# Patient Record
Sex: FEMALE | Race: WHITE | Employment: STUDENT | URBAN - METROPOLITAN AREA
[De-identification: names, ages, dates, MRNs, and addresses within clinical notes are randomized per-mention and may not be internally consistent; named-entity substitution may affect disease eponyms.]

---

## 2017-05-04 ENCOUNTER — APPOINTMENT (OUTPATIENT)
Dept: CT IMAGING | Age: 22
End: 2017-05-04
Payer: COMMERCIAL

## 2017-05-04 ENCOUNTER — HOSPITAL ENCOUNTER (EMERGENCY)
Age: 22
Discharge: HOME | End: 2017-05-04
Attending: EMERGENCY MEDICINE
Payer: COMMERCIAL

## 2017-05-04 VITALS
RESPIRATION RATE: 19 BRPM | TEMPERATURE: 98.1 F | HEIGHT: 68 IN | DIASTOLIC BLOOD PRESSURE: 67 MMHG | HEART RATE: 88 BPM | SYSTOLIC BLOOD PRESSURE: 111 MMHG | WEIGHT: 170 LBS | OXYGEN SATURATION: 98 % | BODY MASS INDEX: 25.76 KG/M2

## 2017-05-04 DIAGNOSIS — S09.90XA HEAD INJURY, INITIAL ENCOUNTER: ICD-10-CM

## 2017-05-04 DIAGNOSIS — S00.01XA SCALP ABRASION, INITIAL ENCOUNTER: Primary | ICD-10-CM

## 2017-05-04 PROCEDURE — 99283 EMERGENCY DEPT VISIT LOW MDM: CPT

## 2017-05-04 PROCEDURE — 6370000000 HC RX 637 (ALT 250 FOR IP): Performed by: EMERGENCY MEDICINE

## 2017-05-04 PROCEDURE — 70450 CT HEAD/BRAIN W/O DYE: CPT

## 2017-05-04 RX ORDER — GINSENG 100 MG
CAPSULE ORAL ONCE
Status: COMPLETED | OUTPATIENT
Start: 2017-05-04 | End: 2017-05-04

## 2017-05-04 RX ORDER — GINSENG 100 MG
CAPSULE ORAL 3 TIMES DAILY
Status: DISCONTINUED | OUTPATIENT
Start: 2017-05-04 | End: 2017-05-04 | Stop reason: HOSPADM

## 2017-05-04 RX ADMIN — BACITRACIN ZINC 1 G: 500 OINTMENT TOPICAL at 01:42

## 2017-05-04 ASSESSMENT — ENCOUNTER SYMPTOMS
STRIDOR: 0
BACK PAIN: 0
TROUBLE SWALLOWING: 0
VOICE CHANGE: 0
WHEEZING: 0
COUGH: 0
BLOOD IN STOOL: 0
CHEST TIGHTNESS: 0
CHOKING: 0
CONSTIPATION: 0
EYE PAIN: 0
SORE THROAT: 0
VOMITING: 0
SINUS PRESSURE: 0
SHORTNESS OF BREATH: 0
EYE REDNESS: 0
ABDOMINAL PAIN: 0
DIARRHEA: 0
FACIAL SWELLING: 0
EYE DISCHARGE: 0

## 2017-05-04 ASSESSMENT — PAIN DESCRIPTION - LOCATION: LOCATION: HEAD;NECK

## 2017-05-04 ASSESSMENT — PAIN DESCRIPTION - PAIN TYPE: TYPE: ACUTE PAIN

## 2017-05-04 ASSESSMENT — PAIN SCALES - GENERAL: PAINLEVEL_OUTOF10: 3

## 2017-05-04 ASSESSMENT — PAIN DESCRIPTION - DESCRIPTORS: DESCRIPTORS: PRESSURE;CONSTANT

## 2017-05-04 ASSESSMENT — PAIN DESCRIPTION - FREQUENCY: FREQUENCY: CONTINUOUS

## 2020-02-21 ENCOUNTER — HOSPITAL ENCOUNTER (EMERGENCY)
Age: 25
Discharge: HOME | End: 2020-02-21
Attending: INTERNAL MEDICINE

## 2020-02-21 VITALS
SYSTOLIC BLOOD PRESSURE: 114 MMHG | OXYGEN SATURATION: 98 % | BODY MASS INDEX: 27.28 KG/M2 | DIASTOLIC BLOOD PRESSURE: 69 MMHG | RESPIRATION RATE: 16 BRPM | TEMPERATURE: 98.1 F | HEIGHT: 68 IN | HEART RATE: 95 BPM | WEIGHT: 180 LBS

## 2020-02-21 PROCEDURE — 6370000000 HC RX 637 (ALT 250 FOR IP): Performed by: INTERNAL MEDICINE

## 2020-02-21 PROCEDURE — 99283 EMERGENCY DEPT VISIT LOW MDM: CPT

## 2020-02-21 RX ORDER — DIAPER,BRIEF,INFANT-TODD,DISP
EACH MISCELLANEOUS 2 TIMES DAILY
Status: DISCONTINUED | OUTPATIENT
Start: 2020-02-21 | End: 2020-02-21

## 2020-02-21 RX ORDER — DIAPER,BRIEF,INFANT-TODD,DISP
EACH MISCELLANEOUS ONCE
Status: COMPLETED | OUTPATIENT
Start: 2020-02-21 | End: 2020-02-21

## 2020-02-21 RX ADMIN — BACITRACIN: 500 OINTMENT TOPICAL at 19:45

## 2020-02-21 NOTE — ED PROVIDER NOTES
[facility] ED  EMERGENCY DEPARTMENT ENCOUNTER      Pt Name: Arabella Hu  MRN: [id number]  [place] 1995  Date of evaluation: 2/21/2020  Provider: Miguel Buchanan MD    CHIEF COMPLAINT       Chief Complaint   Patient presents with    Head Injury     pt states she hit her head last night while intoxicated and states she hit her head on a wall intentionally. pt hit the back of her head, pt denies having neck pain or back pain at this time         HISTORY OF PRESENT ILLNESS   (Location/Symptom, Timing/Onset, Context/Setting, Quality, Duration, Modifying Factors, Severity)  Note limiting factors. Arabella Hu is a 27 y.o. female whose gender pronoun is \"they\" presents to the emergency department accompanied by their father for evaluation and management of head injury. Alma Lozano is a student at [employer] and is finishing there undergraduate degree this spring. They is from [place]. They stated that last night they  hit their head on a wall multiple times while they were intoxicated is an active response in protest.  They later reported that their injury was intentional but they no longer have the intent. They also sustained multiple facial right forearm lacerations due to cutting/self harm. They reports that they is in weekly therapy sessions and the next one is scheduled for next week. They is on no medical treatment of their depression. They adamantly denies suicidal ideation, homicidall ideation, access to weapons. They denies threat to themselves, bullying. They is concerned about their father and his lack of ability to take care of himself. When asked Alma Lozano who brought them here, they reported that their father brought her to the ER. He drove from [place] and surprised her. HPI    Nursing Notes were reviewed. REVIEW OF SYSTEMS    (2-9 systems for level 4, 10 or more for level 5)       REVIEW OF SYSTEMS    Constitutional: Negative for fatigue and fever. session: None    Stress: None   Relationships    Social connections:     Talks on phone: None     Gets together: None     Attends Orthodoxy service: None     Active member of club or organization: None     Attends meetings of clubs or organizations: None     Relationship status: None    Intimate partner violence:     Fear of current or ex partner: None     Emotionally abused: None     Physically abused: None     Forced sexual activity: None   Other Topics Concern    None   Social History Narrative    None       SCREENINGS             PHYSICAL EXAM    (up to 7 for level 4, 8 or more for level 5)     ED Triage Vitals [02/21/20 1837]   BP Temp Temp src Pulse Resp SpO2 Height Weight   114/69 98.1 °F (36.7 °C) -- 95 16 98 % 5' 8\" (1.727 m) 180 lb (81.6 kg)       Physical Exam  Physical Exam   Constitutional:  Appears well, well-developed and well-nourished. No distress noted. Non toxic in appearance. HENT:     Head: Normocephalic and atraumatic. Scalp palpated and no identifiable focus of tenderness is noted    Right Ear: External ear normal. TM normal pearly light reflex. No hemotympanum, mastoid tenderness or wooten sign. LeftEar: External ear normal. TM normal pearly light reflex. No hemotympanum, mastoid tenderness or wooten sign. Nose: Nose normal.     Mouth/Throat: Oropharynx is clear and mucosa moist. No oropharyngeal exudate noted. Posterior pharynx is pink and noninjected. Eyes: Conjunctivae and EOM are normal. Pupils are equal,round, and reactive to light. No scleral icterus. Neck: Normal range of motion. Neck supple. No tracheal deviation present. Cardiovascular: Normal rate, regular rhythm, normal heartsounds and intact distal pulses. Exam reveals no gallop or friction rub. No murmur heard. Pulmonary/Chest: Effort normal and breath sounds are symmetric and normal. No respiratory distress. There are no wheezes, rales or rhonchi.  No tenderness is exhibited upon palpation of the chest

## 2020-03-13 ENCOUNTER — HOSPITAL ENCOUNTER (EMERGENCY)
Age: 25
Discharge: HOME | End: 2020-03-13
Attending: EMERGENCY MEDICINE
Payer: COMMERCIAL

## 2020-03-13 VITALS
SYSTOLIC BLOOD PRESSURE: 133 MMHG | HEART RATE: 131 BPM | TEMPERATURE: 98.7 F | WEIGHT: 185 LBS | HEIGHT: 67 IN | DIASTOLIC BLOOD PRESSURE: 72 MMHG | RESPIRATION RATE: 16 BRPM | BODY MASS INDEX: 29.03 KG/M2

## 2020-03-13 PROCEDURE — 99283 EMERGENCY DEPT VISIT LOW MDM: CPT

## 2020-03-13 NOTE — ED PROVIDER NOTES
declined further psychiatric and medical treatment. She has scars on her own arms from cutting but she was not suicidal at that time either. She does have a psychiatric history, no severe problems since she was 14. Alert and oriented she declined further psychiatric or medical testing. She states she will return for any type of suicidal ideation or mental problems. There are no hallucinations. She is very articulate, makes perfect sense, good diction, high intelligence. I do not feel that I have a legal right to detain her at this juncture. RADIOLOGY:   Non-plain filmimages such as CT, Ultrasound and MRI are read by the radiologist. Plain radiographic images are visualized and preliminarily interpreted by the emergency physician with the below findings:        Interpretation per the Radiologist below, if available at the time ofthis note:    No orders to display     I took her pulse while I was examining her and pulse is 96. ED BEDSIDE ULTRASOUND:   Performed by ED Physician - none    LABS:  Labs Reviewed - No data to display    All other labs were within normal range or not returned as of this dictation. EMERGENCY DEPARTMENT COURSE and DIFFERENTIAL DIAGNOSIS/MDM:   Vitals:    Vitals:    03/13/20 0154   BP: 133/72   Pulse: 131   Resp: 16   Temp: 98.7 °F (37.1 °C)   TempSrc: Oral   Weight: 185 lb (83.9 kg)   Height: 5' 7\" (1.702 m)           MDM    CRITICAL CARE TIME   Total Critical Care time was  minutes, excluding separately reportableprocedures. There was a high probability of clinicallysignificant/life threatening deterioration in the patient's condition which required my urgent intervention. CONSULTS:  None    PROCEDURES:  Unless otherwise noted below, none     Procedures    FINAL IMPRESSION      1.  Adjustment disorder with anxiety          DISPOSITION/PLAN   DISPOSITION Decision To Discharge 03/13/2020 02:04:09 AM      PATIENT REFERRED TO:  Jayla Yan MD  [address] [place] [address] [address]  [facility] [address]  [phone]            DISCHARGE MEDICATIONS:  New Prescriptions    No medications on file          (Please note that portions of this note were completed with a voice recognition program.  Efforts were made to edit the dictations but occasionally words are mis-transcribed.)    Tom Liz MD (electronically signed)  Attending Emergency Physician          Tom Liz MD  03/13/20 8741

## 2020-03-14 ENCOUNTER — HOSPITAL ENCOUNTER (EMERGENCY)
Age: 25
Discharge: ANOTHER HOSPITAL | End: 2020-03-14
Attending: EMERGENCY MEDICINE
Payer: COMMERCIAL

## 2020-03-14 ENCOUNTER — HOSPITAL ENCOUNTER (EMERGENCY)
Age: 25
Discharge: HOME | End: 2020-03-14
Attending: EMERGENCY MEDICINE
Payer: COMMERCIAL

## 2020-03-14 VITALS
TEMPERATURE: 97.9 F | DIASTOLIC BLOOD PRESSURE: 71 MMHG | BODY MASS INDEX: 29.03 KG/M2 | HEART RATE: 79 BPM | OXYGEN SATURATION: 98 % | RESPIRATION RATE: 18 BRPM | WEIGHT: 185 LBS | HEIGHT: 67 IN | SYSTOLIC BLOOD PRESSURE: 110 MMHG

## 2020-03-14 VITALS
OXYGEN SATURATION: 99 % | RESPIRATION RATE: 18 BRPM | DIASTOLIC BLOOD PRESSURE: 79 MMHG | HEART RATE: 78 BPM | BODY MASS INDEX: 28.98 KG/M2 | SYSTOLIC BLOOD PRESSURE: 124 MMHG | WEIGHT: 185 LBS | TEMPERATURE: 98.6 F

## 2020-03-14 LAB
ACETAMINOPHEN LEVEL: <15 UG/ML (ref 10–30)
ALBUMIN SERPL-MCNC: 4.8 G/DL (ref 3.5–4.6)
ALP BLD-CCNC: 46 U/L (ref 40–130)
ALT SERPL-CCNC: 11 U/L (ref 0–33)
AMPHETAMINE SCREEN, URINE: NORMAL
ANION GAP SERPL CALCULATED.3IONS-SCNC: 18 MEQ/L (ref 9–15)
AST SERPL-CCNC: 16 U/L (ref 0–35)
BACTERIA: ABNORMAL /HPF
BARBITURATE SCREEN URINE: NORMAL
BASOPHILS ABSOLUTE: 0 K/UL (ref 0–0.2)
BASOPHILS RELATIVE PERCENT: 0.4 %
BENZODIAZEPINE SCREEN, URINE: NORMAL
BILIRUB SERPL-MCNC: <0.2 MG/DL (ref 0.2–0.7)
BILIRUBIN URINE: NEGATIVE
BLOOD, URINE: ABNORMAL
BUN BLDV-MCNC: 8 MG/DL (ref 6–20)
CALCIUM SERPL-MCNC: 9.6 MG/DL (ref 8.5–9.9)
CANNABINOID SCREEN URINE: NORMAL
CHLORIDE BLD-SCNC: 104 MEQ/L (ref 95–107)
CLARITY: CLEAR
CO2: 21 MEQ/L (ref 20–31)
COCAINE METABOLITE SCREEN URINE: NORMAL
COLOR: YELLOW
CREAT SERPL-MCNC: 0.59 MG/DL (ref 0.5–0.9)
EKG ATRIAL RATE: 116 BPM
EKG ATRIAL RATE: 92 BPM
EKG P AXIS: 40 DEGREES
EKG P AXIS: 44 DEGREES
EKG P-R INTERVAL: 130 MS
EKG P-R INTERVAL: 134 MS
EKG Q-T INTERVAL: 366 MS
EKG Q-T INTERVAL: 376 MS
EKG QRS DURATION: 86 MS
EKG QRS DURATION: 90 MS
EKG QTC CALCULATION (BAZETT): 464 MS
EKG QTC CALCULATION (BAZETT): 508 MS
EKG R AXIS: 34 DEGREES
EKG R AXIS: 50 DEGREES
EKG T AXIS: 22 DEGREES
EKG T AXIS: 32 DEGREES
EKG VENTRICULAR RATE: 116 BPM
EKG VENTRICULAR RATE: 92 BPM
EOSINOPHILS ABSOLUTE: 0 K/UL (ref 0–0.7)
EOSINOPHILS RELATIVE PERCENT: 0.2 %
EPITHELIAL CELLS, UA: ABNORMAL /HPF
ETHANOL PERCENT: 0.07 G/DL
ETHANOL PERCENT: 0.15 G/DL
ETHANOL: 177 MG/DL (ref 0–0.08)
ETHANOL: 74 MG/DL (ref 0–0.08)
GFR AFRICAN AMERICAN: >60
GFR NON-AFRICAN AMERICAN: >60
GLOBULIN: 3.2 G/DL (ref 2.3–3.5)
GLUCOSE BLD-MCNC: 110 MG/DL (ref 70–99)
GLUCOSE URINE: NEGATIVE MG/DL
HCG(URINE) PREGNANCY TEST: NEGATIVE
HCT VFR BLD CALC: 36.1 % (ref 37–47)
HEMOGLOBIN: 11.9 G/DL (ref 12–16)
KETONES, URINE: ABNORMAL MG/DL
LEUKOCYTE ESTERASE, URINE: NEGATIVE
LYMPHOCYTES ABSOLUTE: 2.1 K/UL (ref 1–4.8)
LYMPHOCYTES RELATIVE PERCENT: 30.2 %
Lab: NORMAL
MCH RBC QN AUTO: 28.1 PG (ref 27–31.3)
MCHC RBC AUTO-ENTMCNC: 32.8 % (ref 33–37)
MCV RBC AUTO: 85.6 FL (ref 82–100)
METHADONE SCREEN, URINE: NORMAL
MONOCYTES ABSOLUTE: 0.4 K/UL (ref 0.2–0.8)
MONOCYTES RELATIVE PERCENT: 5.3 %
NEUTROPHILS ABSOLUTE: 4.5 K/UL (ref 1.4–6.5)
NEUTROPHILS RELATIVE PERCENT: 63.9 %
NITRITE, URINE: NEGATIVE
OPIATE SCREEN URINE: NORMAL
OXYCODONE URINE: NORMAL
PDW BLD-RTO: 14.1 % (ref 11.5–14.5)
PH UA: 5 (ref 5–9)
PHENCYCLIDINE SCREEN URINE: NORMAL
PLATELET # BLD: 290 K/UL (ref 130–400)
POTASSIUM SERPL-SCNC: 3.6 MEQ/L (ref 3.4–4.9)
PROPOXYPHENE SCREEN: NORMAL
PROTEIN UA: NEGATIVE MG/DL
RBC # BLD: 4.22 M/UL (ref 4.2–5.4)
RBC UA: ABNORMAL /HPF (ref 0–2)
SALICYLATE, SERUM: <0.3 MG/DL (ref 15–30)
SODIUM BLD-SCNC: 143 MEQ/L (ref 135–144)
SPECIFIC GRAVITY UA: 1.02 (ref 1–1.03)
TOTAL CK: 98 U/L (ref 0–170)
TOTAL PROTEIN: 8 G/DL (ref 6.3–8)
URINE REFLEX TO CULTURE: YES
UROBILINOGEN, URINE: 0.2 E.U./DL
WBC # BLD: 7 K/UL (ref 4.8–10.8)
WBC UA: ABNORMAL /HPF (ref 0–5)

## 2020-03-14 PROCEDURE — G0480 DRUG TEST DEF 1-7 CLASSES: HCPCS

## 2020-03-14 PROCEDURE — 36415 COLL VENOUS BLD VENIPUNCTURE: CPT

## 2020-03-14 PROCEDURE — 85025 COMPLETE CBC W/AUTO DIFF WBC: CPT

## 2020-03-14 PROCEDURE — 80053 COMPREHEN METABOLIC PANEL: CPT

## 2020-03-14 PROCEDURE — 99285 EMERGENCY DEPT VISIT HI MDM: CPT

## 2020-03-14 PROCEDURE — 93005 ELECTROCARDIOGRAM TRACING: CPT

## 2020-03-14 PROCEDURE — 80307 DRUG TEST PRSMV CHEM ANLYZR: CPT

## 2020-03-14 PROCEDURE — 93005 ELECTROCARDIOGRAM TRACING: CPT | Performed by: PSYCHIATRY & NEUROLOGY

## 2020-03-14 PROCEDURE — 81001 URINALYSIS AUTO W/SCOPE: CPT

## 2020-03-14 PROCEDURE — 93005 ELECTROCARDIOGRAM TRACING: CPT | Performed by: EMERGENCY MEDICINE

## 2020-03-14 PROCEDURE — 87086 URINE CULTURE/COLONY COUNT: CPT

## 2020-03-14 PROCEDURE — 82550 ASSAY OF CK (CPK): CPT

## 2020-03-14 PROCEDURE — 6370000000 HC RX 637 (ALT 250 FOR IP): Performed by: EMERGENCY MEDICINE

## 2020-03-14 PROCEDURE — 99284 EMERGENCY DEPT VISIT MOD MDM: CPT

## 2020-03-14 PROCEDURE — 84703 CHORIONIC GONADOTROPIN ASSAY: CPT

## 2020-03-14 RX ORDER — ONDANSETRON 4 MG/1
4 TABLET, ORALLY DISINTEGRATING ORAL ONCE
Status: COMPLETED | OUTPATIENT
Start: 2020-03-14 | End: 2020-03-14

## 2020-03-14 RX ADMIN — ONDANSETRON 4 MG: 4 TABLET, ORALLY DISINTEGRATING ORAL at 13:36

## 2020-03-14 ASSESSMENT — ENCOUNTER SYMPTOMS
NAUSEA: 0
COUGH: 0
WHEEZING: 0
ABDOMINAL DISTENTION: 0
SHORTNESS OF BREATH: 0
ABDOMINAL PAIN: 0
RHINORRHEA: 0
COLOR CHANGE: 0
VOMITING: 0
SORE THROAT: 0
BACK PAIN: 0
EYE PAIN: 0
DIARRHEA: 0
APNEA: 0
PHOTOPHOBIA: 0
SINUS PRESSURE: 0
CONSTIPATION: 0

## 2020-03-14 ASSESSMENT — PAIN DESCRIPTION - ONSET: ONSET: ON-GOING

## 2020-03-14 NOTE — ED TRIAGE NOTES
PT BROUGHT INTO HOSPITAL BY FRIENDS AFTER DRINKING TO [facility] AND WANTING TO STAB SELF

## 2020-03-14 NOTE — ED PROVIDER NOTES
[address] [place] [address] ED  eMERGENCY dEPARTMENT eNCOUnter      Pt Name: Eliza Gasca  MRN: [id number]  [place] 1995  Date of evaluation: 3/14/2020  Provider: Siena Alexander MD    CHIEF COMPLAINT       Chief Complaint   Patient presents with    Alcohol Intoxication    Suicidal         HISTORY OF PRESENT ILLNESS   (Location/Symptom, Timing/Onset,Context/Setting, Quality, Duration, Modifying Factors, Severity)  Note limiting factors. Eliza Gasca is a 27 y.o. female who presents to the emergency department with complaint:  of patient brought in by her college friends alcohol intoxication and suicidal thoughts with a plan. She plans stabbing herself in the Buffy the vampire slayer style. Was seen in our ED yesterday with alcohol intoxication and released after denying suicidal intention or thoughts. She denies any pain or discomfort. HPI    Nursing Notes were reviewed. REVIEW OF SYSTEMS    (2-9 systems for level 4, 10 or more for level 5)     Review of Systems   Constitutional: Negative. Negative for activity change, appetite change, chills, fatigue and fever. HENT: Negative for congestion, ear discharge, ear pain, hearing loss, rhinorrhea, sinus pressure and sore throat. Eyes: Negative for photophobia, pain and visual disturbance. Respiratory: Negative for apnea, cough, shortness of breath and wheezing. Cardiovascular: Negative for chest pain, palpitations and leg swelling. Gastrointestinal: Negative for abdominal distention, abdominal pain, constipation, diarrhea, nausea and vomiting. Endocrine: Negative for cold intolerance, heat intolerance and polyuria. Genitourinary: Negative for dysuria, flank pain, frequency and urgency. Musculoskeletal: Negative for arthralgias, back pain, gait problem, myalgias and neck stiffness. Skin: Negative for color change, pallor and rash. Allergic/Immunologic: Negative for food allergies and immunocompromised state.    Neurological: meetings of clubs or organizations: None     Relationship status: None    Intimate partner violence     Fear of current or ex partner: None     Emotionally abused: None     Physically abused: None     Forced sexual activity: None   Other Topics Concern    None   Social History Narrative    None       SCREENINGS             PHYSICAL EXAM    (up to 7 for level 4, 8 or more for level 5)     ED Triage Vitals [03/14/20 0201]   BP Temp Temp Source Pulse Resp SpO2 Height Weight   120/70 98.6 °F (37 °C) Oral 97 18 99 % -- 185 lb (83.9 kg)       Physical Exam  Vitals signs and nursing note reviewed. Constitutional:       General: She is not in acute distress. Appearance: She is well-developed. She is not ill-appearing, toxic-appearing or diaphoretic. HENT:      Head: Normocephalic and atraumatic. Nose: Nose normal. No congestion or rhinorrhea. Mouth/Throat:      Mouth: Mucous membranes are moist.      Pharynx: Oropharynx is clear. No oropharyngeal exudate or posterior oropharyngeal erythema. Eyes:      General: No scleral icterus. Right eye: No discharge. Left eye: No discharge. Conjunctiva/sclera: Conjunctivae normal.      Pupils: Pupils are equal, round, and reactive to light. Neck:      Musculoskeletal: Normal range of motion and neck supple. No neck rigidity or muscular tenderness. Thyroid: No thyromegaly. Vascular: No carotid bruit or JVD. Trachea: No tracheal deviation. Cardiovascular:      Rate and Rhythm: Normal rate and regular rhythm. Heart sounds: Normal heart sounds. No murmur. No friction rub. No gallop. Pulmonary:      Effort: Pulmonary effort is normal. No respiratory distress. Breath sounds: Normal breath sounds. No stridor. No wheezing, rhonchi or rales. Chest:      Chest wall: No tenderness. Abdominal:      General: Bowel sounds are normal. There is no distension. Palpations: Abdomen is soft. There is no mass. other components within normal limits   CBC WITH AUTO DIFFERENTIAL - Abnormal; Notable for the following components:    Hemoglobin 11.9 (*)     Hematocrit 36.1 (*)     MCHC 32.8 (*)     All other components within normal limits   URINE RT REFLEX TO CULTURE   ETHANOL   SALICYLATE LEVEL   ACETAMINOPHEN LEVEL   TSH WITHOUT REFLEX   URINE DRUG SCREEN, COMPREHENSIVE       All other labs were within normal range or not returned as of this dictation. EMERGENCY DEPARTMENT COURSE and DIFFERENTIALDIAGNOSIS/MDM:   Vitals:    Vitals:    03/14/20 0201   BP: 120/70   Pulse: 97   Resp: 18   Temp: 98.6 °F (37 °C)   TempSrc: Oral   SpO2: 99%   Weight: 185 lb (83.9 kg)           MDM  Number of Diagnoses or Management Options     Amount and/or Complexity of Data Reviewed  Clinical lab tests: reviewed and ordered  Tests in the radiology section of CPT®: reviewed and ordered  Tests in the medicine section of CPT®: ordered and reviewed    Risk of Complications, Morbidity, and/or Mortality  Presenting problems: moderate  Diagnostic procedures: moderate  Management options: moderate    Patient Progress  Patient progress: improved      CRITICAL CARE TIME   Total Critical Care time was  minutes, excluding separately reportable procedures. There was a high probability of clinically significant/life threatening deterioration in the patient's condition which required my urgentintervention. CONSULTS:  None    PROCEDURES:  Unless otherwise noted below, none     Procedures    FINAL IMPRESSION      1. Suicidal ideation    2. Acute alcoholic intoxication without complication [facility])          DISPOSITION/PLAN   DISPOSITION Decision To Transfer 03/14/2020 02:25:32 AM      PATIENT REFERRED TO:  No follow-up provider specified.     DISCHARGE MEDICATIONS:  Current Discharge Medication List             (Please note that portions of this note were completed with a voice recognitionprogram.  Efforts were made to edit the dictations but occasionally words are mis-transcribed.)    Danita Oliveros MD (electronically signed)  Attending Emergency Physician          Danita Oliveros MD  03/14/20 0231       Danita Oliveros MD  03/14/20 8868

## 2020-03-14 NOTE — ED NOTES
Pt shree her mother will arrive from [facility] around 6 30 pr [address].  Alix   03/14/20 8793

## 2020-03-14 NOTE — ED NOTES
Call placed to patients mother, permission granted to inform college housemates that patient was admitted to [address] [place] [address] Luis Manuel, VINCENZO  03/14/20 2798

## 2020-03-14 NOTE — ED PROVIDER NOTES
[facility] ED  EMERGENCY DEPARTMENT ENCOUNTER      Pt Name: Maria Fernanda Jewell  MRN: [id number]  [place] 1995  Date of evaluation: 3/14/2020  Provider: Hillary Kang       Chief Complaint   Patient presents with    Mental Health Problem         HISTORY OF PRESENT ILLNESS   (Location/Symptom, Timing/Onset,Context/Setting, Quality, Duration, Modifying Factors, Severity)  Note limiting factors. Maria Fernanda Jewell is a 27 y.o. female who presents to the emergency department sent from [facility] for further behavioral health evaluation. Patient was drinking with friends and became sad and depressed mood and behavior. Her friends became concerned when she began making comments about wanting to die or killing herself. There is no report of any plan of how to do this report of previous attempts or suicidal behavior. On evaluation patient is denying this. She states she did drink heavily tonight and otherwise feels fine. She has no pain or discomfort denies any recent illnesses or injuries. Patient had base labs drawn while at [facility] and was transferred to [facility] for behavioral specialty and evaluation upon sobriety. Nursing Notes were reviewed. REVIEW OF SYSTEMS    (2-9 systems for level 4, 10 or more for level 5)     Review of Systems   Unable to perform ROS: Psychiatric disorder   Psychiatric/Behavioral: Positive for dysphoric mood and suicidal ideas (without plan). Except as noted above the remainder of the review of systems was reviewed and negative.        PAST MEDICAL HISTORY     Past Medical History:   Diagnosis Date    Anxiety and depression     Depression      Past Surgical History:   Procedure Laterality Date    NOSE SURGERY       Social History     Socioeconomic History    Marital status: Single     Spouse name: None    Number of children: None    Years of education: None    Highest education level: None   Occupational History    None   Social Needs    Financial resource strain: None    Food insecurity     Worry: None     Inability: None    Transportation needs     Medical: None     Non-medical: None   Tobacco Use    Smoking status: Current Some Day Smoker    Smokeless tobacco: Never Used   Substance and Sexual Activity    Alcohol use: Yes     Comment: occassional     Drug use: No    Sexual activity: Not Currently   Lifestyle    Physical activity     Days per week: None     Minutes per session: None    Stress: None   Relationships    Social connections     Talks on phone: None     Gets together: None     Attends Scientology service: None     Active member of club or organization: None     Attends meetings of clubs or organizations: None     Relationship status: None    Intimate partner violence     Fear of current or ex partner: None     Emotionally abused: None     Physically abused: None     Forced sexual activity: None   Other Topics Concern    None   Social History Narrative    None       SCREENINGS             PHYSICAL EXAM    (up to 7 for level 4, 8 or more for level 5)     ED Triage Vitals   BP Temp Temp Source Pulse Resp SpO2 Height Weight   03/14/20 0405 03/14/20 0405 03/14/20 0405 03/14/20 0405 03/14/20 0405 03/14/20 0405 03/14/20 0825 03/14/20 0825   109/73 98.1 °F (36.7 °C) Oral 92 16 98 % 5' 7\" (1.702 m) 185 lb (83.9 kg)       Physical Exam  Constitutional:       General: She is not in acute distress. Appearance: Normal appearance. She is normal weight. She is not ill-appearing, toxic-appearing or diaphoretic. HENT:      Head: Normocephalic and atraumatic. Right Ear: External ear normal.      Left Ear: External ear normal.      Nose: Nose normal.      Mouth/Throat:      Mouth: Mucous membranes are moist.   Eyes:      General:         Right eye: No discharge. Left eye: No discharge.       Conjunctiva/sclera: Conjunctivae normal.   Neck:      Musculoskeletal: Normal range of motion and neck supple. No muscular tenderness. Cardiovascular:      Rate and Rhythm: Normal rate and regular rhythm. Pulses: Normal pulses. Pulmonary:      Effort: Pulmonary effort is normal.   Abdominal:      Tenderness: There is no abdominal tenderness. Musculoskeletal: Normal range of motion. General: No tenderness or signs of injury. Skin:     General: Skin is warm and dry. Capillary Refill: Capillary refill takes less than 2 seconds. Neurological:      General: No focal deficit present. Mental Status: She is alert and oriented to person, place, and time. Mental status is at baseline. Psychiatric:         Attention and Perception: Attention and perception normal.         Mood and Affect: Mood is depressed. Speech: Speech normal.         Behavior: Behavior normal. Behavior is cooperative. Thought Content: Thought content normal.         Cognition and Memory: Cognition and memory normal.         Judgment: Judgment is impulsive.          RESULTS     EKG: All EKG's are interpreted by the Emergency Department Physician who either signs or Co-signsthis chart in the absence of a cardiologist.        RADIOLOGY:   Luz Maria Folds such as CT, Ultrasound and MRI are read by the radiologist. Plain radiographic images are visualized and preliminarily interpreted by the emergency physician with the below findings:        Interpretation per the Radiologist below, if available at the time ofthis note:    No orders to display         ED BEDSIDE ULTRASOUND:   Performed by ED Physician - none    LABS:  Labs Reviewed   URINE RT REFLEX TO CULTURE - Abnormal; Notable for the following components:       Result Value    Ketones, Urine TRACE (*)     Blood, Urine SMALL (*)     All other components within normal limits   MICROSCOPIC URINALYSIS - Abnormal; Notable for the following components:    RBC, UA 3-5 (*)     Bacteria, UA FEW (*)     All other components within follow up. Return if worse in any way.       DISCHARGE MEDICATIONS:  New Prescriptions    No medications on file          (Please notethat portions of this note were completed with a voice recognition program.  Efforts were made to edit the dictations but occasionally words are mis-transcribed.)    SPARKLE Nevarez CNP (electronically signed)  Attending Emergency Physician           SPARKLE Nevarez CNP  03/14/20 1444

## 2020-03-14 NOTE — ED NOTES
Pt gave urine per request. Up to bathroom with steady gaite. [place]  [place], [address] [place] [address]  03/14/20 6259

## 2020-03-14 NOTE — DISCHARGE INSTR - COC
Continuity of Care Form    Patient Name: Chata Hall   :  1995  MRN:  [id number]    Admit date:  3/14/2020  Discharge date:  ***    Code Status Order: No Order   Advance Directives:     Admitting Physician:  No admitting provider for patient encounter. PCP: No primary care provider on file. Discharging Nurse: [facility] Unit/Room#: BAC01/BAC01  Discharging Unit Phone Number: ***    Emergency Contact:   Extended Emergency Contact Information  Primary Emergency Contact: Silke Ricardo  Address: [address] [place] [address] Phone: [phone]  Relation: Parent    Past Surgical History:  Past Surgical History:   Procedure Laterality Date    NOSE SURGERY         Immunization History: There is no immunization history on file for this patient. Active Problems: There is no problem list on file for this patient.       Isolation/Infection:   Isolation          No Isolation        Patient Infection Status     None to display          Nurse Assessment:  Last Vital Signs: /71   Pulse 79   Temp 97.9 °F (36.6 °C) (Oral)   Resp 18   Ht 5' 7\" (1.702 m)   Wt 185 lb (83.9 kg)   LMP 2020   SpO2 98%   BMI 28.98 kg/m²     Last documented pain score (0-10 scale):    Last Weight:   Wt Readings from Last 1 Encounters:   20 185 lb (83.9 kg)     Mental Status:  {IP PT MENTAL STATUS:38020}    IV Access:  { KIKI IV ACCESS:483098053}    Nursing Mobility/ADLs:  Walking   {P DME WRVM:350968701}  Transfer  {P DME PJDW:565026978}  Bathing  {CHP DME WMTK:964023361}  Dressing  {P DME CJJM:280041023}  Toileting  {P DME DBXM:119582092}  Feeding  {P DME UUOF:075324880}  Med Admin  {P DME CYRQ:427859487}  Med Delivery   { KIKI MED Delivery:071316056}    Wound Care Documentation and Therapy:        Elimination:  Continence:   · Bowel: {YES / KD:32392}  · Bladder: {YES / NI:73250}  Urinary Catheter: {Urinary Catheter:598263770} Colostomy/Ileostomy/Ileal Conduit: {YES / TQ:73710}       Date of Last BM: ***  No intake or output data in the 24 hours ending 20 1803  No intake/output data recorded.     Safety Concerns:     [address] [facility] Safety Concerns:061343403}    Impairments/Disabilities:      [address] [facility] Impairments/Disabilities:362383773}    Nutrition Therapy:  Current Nutrition Therapy:   [facility] Diet List:363910671}    Routes of Feeding: {CHP DME Other Feedings:317894757}  Liquids: {Slp liquid thickness:08269}  Daily Fluid Restriction: {CHP DME Yes amt example:923412666}  Last Modified Barium Swallow with Video (Video Swallowing Test): {Done Not Done XRIZ:554328493}    Treatments at the Time of Hospital Discharge:   Respiratory Treatments: ***  Oxygen Therapy:  {Therapy; copd oxygen:06230}  Ventilator:    {MH CC Vent PKIY:060652414}    Rehab Therapies: {THERAPEUTIC INTERVENTION:1161166005}  Weight Bearing Status/Restrictions: [facility] Weight Bearin}  Other Medical Equipment (for information only, NOT a DME order):  {EQUIPMENT:480497188}  Other Treatments: ***    Patient's personal belongings (please select all that are sent with patient):  {[facility] DME Belongings:547687527}    RN SIGNATURE:  {Esignature:164926627}    CASE MANAGEMENT/SOCIAL WORK SECTION    Inpatient Status Date: ***    Readmission Risk Assessment Score:  Readmission Risk              Risk of Unplanned Readmission:        0           Discharging to Facility/ Agency   · Name:   · Address:  · Phone:  · Fax:    Dialysis Facility (if applicable)   · Name:  · Address:  · Dialysis Schedule:  · Phone:  · Fax:    / signature: {Esignature:164238751}    PHYSICIAN SECTION    Prognosis: {Prognosis:1563785736}    Condition at Discharge: [address] Patient Condition:323206369}    Rehab Potential (if transferring to Rehab): {Prognosis:3985020542}    Recommended Labs or Other Treatments After Discharge: ***    Physician Certification: I certify the above information

## 2020-03-14 NOTE — ED NOTES
Patient is sleeping respirations are even and unlabored no distress noted at this time     Rafa Quintanilla, VINCENZO  03/14/20 0512

## 2020-03-15 LAB — URINE CULTURE, ROUTINE: NORMAL

## 2020-03-17 PROCEDURE — 93010 ELECTROCARDIOGRAM REPORT: CPT | Performed by: INTERNAL MEDICINE
